# Patient Record
Sex: MALE | Race: WHITE | NOT HISPANIC OR LATINO | Employment: FULL TIME | URBAN - METROPOLITAN AREA
[De-identification: names, ages, dates, MRNs, and addresses within clinical notes are randomized per-mention and may not be internally consistent; named-entity substitution may affect disease eponyms.]

---

## 2021-06-02 ENCOUNTER — HOSPITAL ENCOUNTER (OUTPATIENT)
Dept: GENERAL RADIOLOGY | Facility: HOSPITAL | Age: 26
Discharge: HOME | End: 2021-06-02
Admitting: FAMILY MEDICINE

## 2021-06-02 ENCOUNTER — OFFICE VISIT (OUTPATIENT)
Dept: FAMILY MEDICINE CLINIC | Facility: CLINIC | Age: 26
End: 2021-06-02

## 2021-06-02 VITALS
RESPIRATION RATE: 20 BRPM | HEART RATE: 100 BPM | TEMPERATURE: 98.7 F | OXYGEN SATURATION: 100 % | SYSTOLIC BLOOD PRESSURE: 110 MMHG | HEIGHT: 66 IN | BODY MASS INDEX: 23.18 KG/M2 | DIASTOLIC BLOOD PRESSURE: 80 MMHG | WEIGHT: 144.2 LBS

## 2021-06-02 DIAGNOSIS — G89.29 CHRONIC PAIN OF LEFT KNEE: ICD-10-CM

## 2021-06-02 DIAGNOSIS — Z00.00 ANNUAL PHYSICAL EXAM: Primary | ICD-10-CM

## 2021-06-02 DIAGNOSIS — M25.562 CHRONIC PAIN OF LEFT KNEE: ICD-10-CM

## 2021-06-02 DIAGNOSIS — M53.3 PAIN OF LEFT SACROILIAC JOINT: ICD-10-CM

## 2021-06-02 PROCEDURE — 73502 X-RAY EXAM HIP UNI 2-3 VIEWS: CPT

## 2021-06-02 PROCEDURE — 99385 PREV VISIT NEW AGE 18-39: CPT | Performed by: FAMILY MEDICINE

## 2021-06-02 RX ORDER — NAPROXEN 500 MG/1
TABLET ORAL
Qty: 60 TABLET | Refills: 0 | Status: SHIPPED | OUTPATIENT
Start: 2021-06-02 | End: 2022-06-28

## 2021-06-02 NOTE — PROGRESS NOTES
Subjective   Sayra Goldman is a 26 y.o. male.     History of Present Illness     Sayra Goldman 26 y.o. male who presents for yearly preventive exam.  His overall health is: good  He exercises works at [employer]..  He does not see his dentist regularly  His diet is typical american  He describes his alcohol intake as social drinker  He is sexually active  He does not have ED or other bedroom issues  Does patient smoke No      Pt notes that he has a lot of back  And knee pain.   Pain inn his left lower back has been present since his teen years  This pain has been present for years  He has never seen anyone for this  The pain is constant in his back  aggravated by long sitting, alleviated with position  Pain lower left side and into his left hip    Left knee has been bothering him as well  Worse when his back is hurting, can be associated with knee pain as well    The following portions of the patient's history were reviewed and updated as appropriate: allergies, current medications, past family history, past medical history, past social history, past surgical history and problem list.    Review of Systems   Constitutional: Negative.    HENT: Negative.    Eyes: Negative.    Respiratory: Negative.  Negative for shortness of breath.    Cardiovascular: Negative.  Negative for chest pain.   Gastrointestinal: Negative.    Musculoskeletal:        Hpi   Skin: Negative.    Neurological: Negative.    Psychiatric/Behavioral: Negative.  Negative for dysphoric mood. The patient is not nervous/anxious.    All other systems reviewed and are negative.      Objective   Physical Exam  Vitals and nursing note reviewed.   Constitutional:       General: He is not in acute distress.     Appearance: Normal appearance. He is well-developed.   HENT:      Head: Normocephalic and atraumatic.      Right Ear: Tympanic membrane, ear canal and external ear normal.      Left Ear: Tympanic membrane, ear canal and external ear normal.       Nose: Nose normal.   Eyes:      Extraocular Movements: Extraocular movements intact.      Conjunctiva/sclera: Conjunctivae normal.   Neck:      Thyroid: No thyromegaly.   Cardiovascular:      Rate and Rhythm: Normal rate and regular rhythm.      Heart sounds: Normal heart sounds. No murmur heard.     Pulmonary:      Effort: Pulmonary effort is normal. No respiratory distress.      Breath sounds: Normal breath sounds.   Abdominal:      General: Bowel sounds are normal. There is no distension.      Palpations: Abdomen is soft.      Tenderness: There is no abdominal tenderness.   Musculoskeletal:      Cervical back: Normal range of motion and neck supple.        Back:    Lymphadenopathy:      Cervical: No cervical adenopathy.   Skin:     General: Skin is warm and dry.   Neurological:      Mental Status: He is alert and oriented to person, place, and time.   Psychiatric:         Mood and Affect: Mood normal.         Behavior: Behavior normal.         Thought Content: Thought content normal.         Judgment: Judgment normal.         Assessment/Plan   Diagnoses and all orders for this visit:    1. Annual physical exam (Primary)    2. Pain of left sacroiliac joint  -     naproxen (Naprosyn) 500 MG tablet; 1 PO BID PRN pain in low back or knee  Dispense: 60 tablet; Refill: 0  -     XR Hip With or Without Pelvis 2 - 3 View Left; Future    3. Chronic pain of left knee  -     naproxen (Naprosyn) 500 MG tablet; 1 PO BID PRN pain in low back or knee  Dispense: 60 tablet; Refill: 0  -     XR Hip With or Without Pelvis 2 - 3 View Left; Future    counseled pt about well adult care including diet and exercise  Will check XR of left hip and treat with NSAID.  Consider PT if XR is normal.  Discussed with pt and will call back with XR results.

## 2021-06-03 DIAGNOSIS — M53.3 PAIN OF LEFT SACROILIAC JOINT: Primary | ICD-10-CM

## 2021-06-03 DIAGNOSIS — G89.29 CHRONIC PAIN OF LEFT KNEE: ICD-10-CM

## 2021-06-03 DIAGNOSIS — M25.562 CHRONIC PAIN OF LEFT KNEE: ICD-10-CM

## 2021-06-07 ENCOUNTER — TELEPHONE (OUTPATIENT)
Dept: FAMILY MEDICINE CLINIC | Facility: CLINIC | Age: 26
End: 2021-06-07

## 2021-06-07 NOTE — TELEPHONE ENCOUNTER
Caller: JOHANNA    Relationship: SELF    Best call back number: [phone]    Caller requesting test results: JOHANNA    What test was performed: XRAY    When was the test performed:  06/02/21    Where was the test performed: DOWN THE STREET.    Additional notes: CALLING FOR RESULTS

## 2022-06-28 ENCOUNTER — OFFICE VISIT (OUTPATIENT)
Dept: FAMILY MEDICINE CLINIC | Facility: CLINIC | Age: 27
End: 2022-06-28

## 2022-06-28 VITALS
RESPIRATION RATE: 18 BRPM | BODY MASS INDEX: 23.5 KG/M2 | OXYGEN SATURATION: 97 % | TEMPERATURE: 97.7 F | WEIGHT: 146.2 LBS | DIASTOLIC BLOOD PRESSURE: 82 MMHG | HEIGHT: 66 IN | SYSTOLIC BLOOD PRESSURE: 116 MMHG | HEART RATE: 82 BPM

## 2022-06-28 DIAGNOSIS — F41.1 GENERALIZED ANXIETY DISORDER: ICD-10-CM

## 2022-06-28 DIAGNOSIS — F33.42 RECURRENT MAJOR DEPRESSIVE DISORDER, IN FULL REMISSION: Primary | ICD-10-CM

## 2022-06-28 PROCEDURE — 99213 OFFICE O/P EST LOW 20 MIN: CPT | Performed by: FAMILY MEDICINE

## 2022-06-28 RX ORDER — SERTRALINE HYDROCHLORIDE 100 MG/1
100 TABLET, FILM COATED ORAL DAILY
Qty: 30 TABLET | Refills: 6 | Status: SHIPPED | OUTPATIENT
Start: 2022-06-28 | End: 2022-12-19

## 2022-06-28 NOTE — PROGRESS NOTES
Husam Goldman is a 27 y.o. male.     History of Present Illness     He was placed on celexa by provider at his work  He notes the medicine helped but he had SE with anorgasmia  This has helped his depresison and his anxiety but the SE makes him want to change  Here to discuss options for his mood        Review of Systems   Constitutional: Negative.        Objective   Physical Exam  Vitals and nursing note reviewed.   Constitutional:       General: He is not in acute distress.     Appearance: Normal appearance. He is well-developed.   Cardiovascular:      Rate and Rhythm: Normal rate and regular rhythm.      Heart sounds: Normal heart sounds.   Pulmonary:      Effort: Pulmonary effort is normal.      Breath sounds: Normal breath sounds.   Neurological:      Mental Status: He is alert and oriented to person, place, and time.   Psychiatric:         Mood and Affect: Mood normal.         Behavior: Behavior normal.         Thought Content: Thought content normal.         Judgment: Judgment normal.         Assessment & Plan   Diagnoses and all orders for this visit:    1. Recurrent major depressive disorder, in full remission (HCC) (Primary)  -     sertraline (Zoloft) 100 MG tablet; Take 1 tablet by mouth Daily.  Dispense: 30 tablet; Refill: 6    2. Generalized anxiety disorder  -     sertraline (Zoloft) 100 MG tablet; Take 1 tablet by mouth Daily.  Dispense: 30 tablet; Refill: 6    due to SE of celexa, will try zoloft.  Pt to call in one month with update

## 2022-12-19 ENCOUNTER — OFFICE VISIT (OUTPATIENT)
Dept: FAMILY MEDICINE CLINIC | Facility: CLINIC | Age: 27
End: 2022-12-19

## 2022-12-19 VITALS
WEIGHT: 152 LBS | HEIGHT: 66 IN | HEART RATE: 74 BPM | TEMPERATURE: 97.3 F | DIASTOLIC BLOOD PRESSURE: 72 MMHG | RESPIRATION RATE: 18 BRPM | SYSTOLIC BLOOD PRESSURE: 100 MMHG | BODY MASS INDEX: 24.43 KG/M2

## 2022-12-19 DIAGNOSIS — G89.29 CHRONIC MIDLINE LOW BACK PAIN WITHOUT SCIATICA: ICD-10-CM

## 2022-12-19 DIAGNOSIS — F41.1 GENERALIZED ANXIETY DISORDER: Primary | ICD-10-CM

## 2022-12-19 DIAGNOSIS — R05.1 ACUTE COUGH: ICD-10-CM

## 2022-12-19 DIAGNOSIS — F33.42 RECURRENT MAJOR DEPRESSIVE DISORDER, IN FULL REMISSION: ICD-10-CM

## 2022-12-19 DIAGNOSIS — H91.8X1 OTHER SPECIFIED HEARING LOSS OF RIGHT EAR, UNSPECIFIED HEARING STATUS ON CONTRALATERAL SIDE: ICD-10-CM

## 2022-12-19 DIAGNOSIS — M54.50 CHRONIC MIDLINE LOW BACK PAIN WITHOUT SCIATICA: ICD-10-CM

## 2022-12-19 DIAGNOSIS — R19.7 DIARRHEA, UNSPECIFIED TYPE: ICD-10-CM

## 2022-12-19 DIAGNOSIS — Z11.59 ENCOUNTER FOR HEPATITIS C SCREENING TEST FOR LOW RISK PATIENT: ICD-10-CM

## 2022-12-19 PROCEDURE — 99214 OFFICE O/P EST MOD 30 MIN: CPT | Performed by: FAMILY MEDICINE

## 2022-12-19 RX ORDER — ESCITALOPRAM OXALATE 10 MG/1
10 TABLET ORAL DAILY
Qty: 30 TABLET | Refills: 5 | Status: SHIPPED | OUTPATIENT
Start: 2022-12-19

## 2022-12-19 RX ORDER — DEXTROMETHORPHAN HYDROBROMIDE AND PROMETHAZINE HYDROCHLORIDE 15; 6.25 MG/5ML; MG/5ML
5 SYRUP ORAL 4 TIMES DAILY PRN
Qty: 120 ML | Refills: 0 | Status: SHIPPED | OUTPATIENT
Start: 2022-12-19

## 2022-12-19 NOTE — PROGRESS NOTES
Subjective   Marito Goldman is a 27 y.o. male.     History of Present Illness     His mood is doing well but the zoloft is causing him to wake up in the middle of the night and need to urinate in the AM  Would like to try something else at this point      He has had longstanding loss of hearing in his right ear  He would like to see ENT for evaluation of this      He has frequent, liquid bowel movements the past couple years  This is bothering him and he would like some answers    He would like THC for his back pain      The following portions of the patient's history were reviewed and updated as appropriate: allergies, current medications, past family history, past medical history, past social history, past surgical history and problem list.    Review of Systems   Constitutional: Negative.    HENT: Positive for hearing loss.    Gastrointestinal: Positive for diarrhea.   Musculoskeletal: Positive for back pain.   Psychiatric/Behavioral: Negative.        Objective   Physical Exam  Vitals and nursing note reviewed.   Constitutional:       General: He is not in acute distress.     Appearance: Normal appearance. He is well-developed.   Cardiovascular:      Rate and Rhythm: Normal rate and regular rhythm.      Heart sounds: Normal heart sounds.   Pulmonary:      Effort: Pulmonary effort is normal.      Breath sounds: Normal breath sounds.   Neurological:      Mental Status: He is alert and oriented to person, place, and time.   Psychiatric:         Mood and Affect: Mood normal.         Behavior: Behavior normal.         Thought Content: Thought content normal.         Judgment: Judgment normal.         Assessment & Plan   Diagnoses and all orders for this visit:    1. Generalized anxiety disorder (Primary)  -     escitalopram (Lexapro) 10 MG tablet; Take 1 tablet by mouth Daily.  Dispense: 30 tablet; Refill: 5    2. Recurrent major depressive disorder, in full remission (HCC)  -     escitalopram (Lexapro) 10 MG tablet;  Take 1 tablet by mouth Daily.  Dispense: 30 tablet; Refill: 5    3. Other specified hearing loss of right ear, unspecified hearing status on contralateral side  -     Ambulatory Referral to ENT (Otolaryngology)    4. Diarrhea, unspecified type  -     Ambulatory Referral to Gastroenterology  -     CBC & Differential  -     Comprehensive Metabolic Panel  -     Allergen Food Panel    5. Acute cough  -     promethazine-dextromethorphan (PROMETHAZINE-DM) 6.25-15 MG/5ML syrup; Take 5 mL by mouth 4 (Four) Times a Day As Needed for Cough.  Dispense: 120 mL; Refill: 0    6. Chronic midline low back pain without sciatica    7. Encounter for hepatitis C screening test for low risk patient  -     Hepatitis C Antibody    failed zoloft and celexa due to SE.  Will try lexapro for mood and he will call back in 1 month with update.  Mood just depressed  Will work on GI referral for diarrhea and check food allergy panel.  F/u pending results  Ok phenergan DM for cough

## 2022-12-23 LAB
ALBUMIN SERPL-MCNC: 4.5 G/DL (ref 4.1–5.2)
ALBUMIN/GLOB SERPL: 1.5 {RATIO} (ref 1.2–2.2)
ALP SERPL-CCNC: 85 IU/L (ref 44–121)
ALT SERPL-CCNC: 42 IU/L (ref 0–44)
AST SERPL-CCNC: 30 IU/L (ref 0–40)
BARLEY IGE QN: <0.1 KU/L
BASOPHILS # BLD AUTO: 0 X10E3/UL (ref 0–0.2)
BASOPHILS NFR BLD AUTO: 1 %
BEEF IGE QN: <0.1 KU/L
BILIRUB SERPL-MCNC: 0.3 MG/DL (ref 0–1.2)
BUN SERPL-MCNC: 9 MG/DL (ref 6–20)
BUN/CREAT SERPL: 11 (ref 9–20)
CABBAGE IGE QN: <0.1 KU/L
CALCIUM SERPL-MCNC: 9.7 MG/DL (ref 8.7–10.2)
CARROT IGE QN: <0.1 KU/L
CHICKEN MEAT IGE QN: <0.1 KU/L
CHLORIDE SERPL-SCNC: 103 MMOL/L (ref 96–106)
CO2 SERPL-SCNC: 26 MMOL/L (ref 20–29)
CODFISH IGE QN: <0.1 KU/L
CONV CLASS DESCRIPTION: ABNORMAL
CORN IGE QN: <0.1 KU/L
COW MILK IGE QN: <0.1 KU/L
CRAB IGE QN: 0.19 KU/L
CREAT SERPL-MCNC: 0.85 MG/DL (ref 0.76–1.27)
EGFRCR SERPLBLD CKD-EPI 2021: 121 ML/MIN/1.73
EGG WHITE IGE QN: <0.1 KU/L
EOSINOPHIL # BLD AUTO: 0.3 X10E3/UL (ref 0–0.4)
EOSINOPHIL NFR BLD AUTO: 4 %
ERYTHROCYTE [DISTWIDTH] IN BLOOD BY AUTOMATED COUNT: 12 % (ref 11.6–15.4)
GLOBULIN SER CALC-MCNC: 3 G/DL (ref 1.5–4.5)
GLUCOSE SERPL-MCNC: 91 MG/DL (ref 70–99)
GRAPE IGE QN: <0.1 KU/L
GREEN PEPPER IGE QN: <0.1 KU/L
HCT VFR BLD AUTO: 46.2 % (ref 37.5–51)
HCV AB S/CO SERPL IA: <0.1 S/CO RATIO (ref 0–0.9)
HGB BLD-MCNC: 16 G/DL (ref 13–17.7)
IMM GRANULOCYTES # BLD AUTO: 0 X10E3/UL (ref 0–0.1)
IMM GRANULOCYTES NFR BLD AUTO: 0 %
LETTUCE IGE QN: <0.1 KU/L
LYMPHOCYTES # BLD AUTO: 1.9 X10E3/UL (ref 0.7–3.1)
LYMPHOCYTES NFR BLD AUTO: 23 %
MCH RBC QN AUTO: 31.9 PG (ref 26.6–33)
MCHC RBC AUTO-ENTMCNC: 34.6 G/DL (ref 31.5–35.7)
MCV RBC AUTO: 92 FL (ref 79–97)
MONOCYTES # BLD AUTO: 0.6 X10E3/UL (ref 0.1–0.9)
MONOCYTES NFR BLD AUTO: 7 %
NEUTROPHILS # BLD AUTO: 5.6 X10E3/UL (ref 1.4–7)
NEUTROPHILS NFR BLD AUTO: 65 %
OAT IGE QN: <0.1 KU/L
ORANGE IGE QN: <0.1 KU/L
PEANUT IGE QN: <0.1 KU/L
PLATELET # BLD AUTO: 279 X10E3/UL (ref 150–450)
PORK IGE QN: <0.1 KU/L
POTASSIUM SERPL-SCNC: 4.4 MMOL/L (ref 3.5–5.2)
POTATO IGE QN: <0.1 KU/L
PROT SERPL-MCNC: 7.5 G/DL (ref 6–8.5)
RBC # BLD AUTO: 5.01 X10E6/UL (ref 4.14–5.8)
RICE IGE QN: <0.1 KU/L
RYE IGE QN: <0.1 KU/L
SHRIMP IGE QN: <0.1 KU/L
SODIUM SERPL-SCNC: 142 MMOL/L (ref 134–144)
SOYBEAN IGE QN: <0.1 KU/L
TOMATO IGE QN: <0.1 KU/L
TUNA IGE QN: <0.1 KU/L
WBC # BLD AUTO: 8.5 X10E3/UL (ref 3.4–10.8)
WHEAT IGE QN: <0.1 KU/L
WHITE BEAN IGE QN: <0.1 KU/L

## 2023-05-19 ENCOUNTER — TELEPHONE (OUTPATIENT)
Dept: FAMILY MEDICINE CLINIC | Facility: CLINIC | Age: 28
End: 2023-05-19
Payer: COMMERCIAL

## 2023-05-19 DIAGNOSIS — F41.1 GENERALIZED ANXIETY DISORDER: ICD-10-CM

## 2023-05-19 DIAGNOSIS — F33.42 RECURRENT MAJOR DEPRESSIVE DISORDER, IN FULL REMISSION: ICD-10-CM

## 2023-05-19 RX ORDER — ESCITALOPRAM OXALATE 10 MG/1
10 TABLET ORAL DAILY
Qty: 30 TABLET | Refills: 5 | Status: CANCELLED | OUTPATIENT
Start: 2023-05-19

## 2023-05-19 NOTE — TELEPHONE ENCOUNTER
Caller: Sayra Goldman Gene    Relationship: Self    Best call back number: [phone]    What form or medical record are you requesting: PAPERWORK STATING HIS 3 DAY LEAVE FROM WORK WAS RECOMMENDED.     Who is requesting this form or medical record from you: EMPLOYER     How would you like to receive the form or medical records (pick-up, mail, fax): FAX IF POSSIBLE TO EMPLOYER     IF FAX NOT POSSIBLE, PATIENT WILL .     Timeframe paperwork needed: ASAP    Additional notes: PATIENT STATES HIS MEDICATION WAS MAKING HIM FEEL BAD AND HAS TAKEN A LEAVE AND HE NEEDS A NOTE FOR THE EMPLOYER TO JUSTIFY IT.

## 2023-05-19 NOTE — TELEPHONE ENCOUNTER
Caller: Sayra Goldman Gene    Relationship: Self    Best call back number: [phone]    What medications are you currently taking:   Current Outpatient Medications on File Prior to Visit   Medication Sig Dispense Refill   • escitalopram (Lexapro) 10 MG tablet Take 1 tablet by mouth Daily. 30 tablet 5   • promethazine-dextromethorphan (PROMETHAZINE-DM) 6.25-15 MG/5ML syrup Take 5 mL by mouth 4 (Four) Times a Day As Needed for Cough. 120 mL 0     No current facility-administered medications on file prior to visit.          When did you start taking these medications: COUPLE MONTHS     Which medication are you concerned about: escitalopram (Lexapro) 10 MG tablet    Who prescribed you this medication: MD MYERS     What are your concerns: PATIENTS STATES HE IS HAVING WEIGHT GAIN AND IS FEELING LETHARGIC. PATIENT IS REQUESTING TO HAVE SOMETHING SIMILAR SENT IN.     PATIENT STATES HE STOPPED TAKING IT A COUPLE WEEKS AGO.     How long have you had these concerns: SINCE TAKING THE MEDICATION.

## 2023-06-01 ENCOUNTER — TELEPHONE (OUTPATIENT)
Dept: FAMILY MEDICINE CLINIC | Facility: CLINIC | Age: 28
End: 2023-06-01